# Patient Record
Sex: MALE | Race: WHITE | NOT HISPANIC OR LATINO | URBAN - METROPOLITAN AREA
[De-identification: names, ages, dates, MRNs, and addresses within clinical notes are randomized per-mention and may not be internally consistent; named-entity substitution may affect disease eponyms.]

---

## 2024-06-21 ENCOUNTER — OFFICE VISIT (OUTPATIENT)
Dept: UROLOGY | Facility: MEDICAL CENTER | Age: 34
End: 2024-06-21
Payer: COMMERCIAL

## 2024-06-21 VITALS
HEART RATE: 62 BPM | WEIGHT: 215 LBS | BODY MASS INDEX: 30.78 KG/M2 | DIASTOLIC BLOOD PRESSURE: 70 MMHG | OXYGEN SATURATION: 98 % | HEIGHT: 70 IN | SYSTOLIC BLOOD PRESSURE: 120 MMHG

## 2024-06-21 DIAGNOSIS — Z30.09 STERILIZATION CONSULT: Primary | ICD-10-CM

## 2024-06-21 PROCEDURE — 99203 OFFICE O/P NEW LOW 30 MIN: CPT | Performed by: UROLOGY

## 2024-06-21 RX ORDER — OMEGA-3S/DHA/EPA/FISH OIL/D3 300MG-1000
400 CAPSULE ORAL DAILY
COMMUNITY

## 2024-06-21 RX ORDER — DIPHENOXYLATE HYDROCHLORIDE AND ATROPINE SULFATE 2.5; .025 MG/1; MG/1
1 TABLET ORAL DAILY
COMMUNITY

## 2024-06-21 RX ORDER — ALPRAZOLAM 1 MG/1
TABLET ORAL
Qty: 1 TABLET | Refills: 0 | Status: SHIPPED | OUTPATIENT
Start: 2024-06-21

## 2024-06-21 RX ORDER — RIBOFLAVIN (VITAMIN B2) 100 MG
100 TABLET ORAL DAILY
COMMUNITY

## 2024-06-21 NOTE — PROGRESS NOTES
"   HISTORY:        This patient has 2 children and would like to have a vasectomy.  He and his wife or partner are sure they want no more children, and are aware that vasectomy is intended to be a permanent form of sterilization.    He has been told and understands the following:         We will order a semen analysis to check for sterility after two months, and that he must use protection during that time.         No guarantee can be made of permanent sterility, and that failure of the procedure is not common, but can occur.  Furthermore, spontaneous reestablishment of the flow sperm is quite rare but is a possible reason for failure of the procedure.           Although it is not mandatory, if he wants to request another semen sample at any time in the future, to ensure continued sterility, he can do so, at his decision.         Potential complications of the procedure include, but are not limited to:  Excessive bleeding which can cause significant swelling; infections; chronic lingering pain of the testicle; damage to the blood supply of the testicle, which might lead to testicular atrophy.         The patient has told me he understands the above statements, and wishes to proceed with scheduling the vasectomy.    The following portions of the patient's history were reviewed and updated as appropriate: allergies, current medications, past family history, past medical history, past social history, past surgical history, and problem list.    Review of Systems      Objective:     Physical Exam      No results found for: \"PSA\"]  BUN   Date Value Ref Range Status   08/07/2020 12 7 - 28 mg/dL Final     Creatinine   Date Value Ref Range Status   08/07/2020 1.08 0.53 - 1.30 mg/dL Final     No components found for: \"CBC\"      There is no problem list on file for this patient.       Diagnoses and all orders for this visit:    Sterilization consult  -     ALPRAZolam (XANAX) 1 mg tablet; 1-2 hr before procedure    Other " orders  -     multivitamin (THERAGRAN) TABS; Take 1 tablet by mouth daily  -     Ascorbic Acid (vitamin C) 100 MG tablet; Take 100 mg by mouth daily  -     cholecalciferol (VITAMIN D3) 400 units tablet; Take 400 Units by mouth daily  -     Omega-3 Fatty Acids (Fish Oil) 300 MG CAPS; Take by mouth           Patient ID: Terrell Oconnor is a 33 y.o. male.      Current Outpatient Medications:     ALPRAZolam (XANAX) 1 mg tablet, 1-2 hr before procedure, Disp: 1 tablet, Rfl: 0    Ascorbic Acid (vitamin C) 100 MG tablet, Take 100 mg by mouth daily, Disp: , Rfl:     cholecalciferol (VITAMIN D3) 400 units tablet, Take 400 Units by mouth daily, Disp: , Rfl:     multivitamin (THERAGRAN) TABS, Take 1 tablet by mouth daily, Disp: , Rfl:     Omega-3 Fatty Acids (Fish Oil) 300 MG CAPS, Take by mouth, Disp: , Rfl:     History reviewed. No pertinent past medical history.    Past Surgical History:   Procedure Laterality Date    WISDOM TOOTH EXTRACTION         Social History

## 2024-08-16 ENCOUNTER — PROCEDURE VISIT (OUTPATIENT)
Dept: UROLOGY | Facility: MEDICAL CENTER | Age: 34
End: 2024-08-16
Payer: COMMERCIAL

## 2024-08-16 VITALS
DIASTOLIC BLOOD PRESSURE: 66 MMHG | SYSTOLIC BLOOD PRESSURE: 100 MMHG | WEIGHT: 207 LBS | BODY MASS INDEX: 29.63 KG/M2 | HEIGHT: 70 IN | OXYGEN SATURATION: 99 % | HEART RATE: 67 BPM

## 2024-08-16 DIAGNOSIS — Z30.2 ENCOUNTER FOR VASECTOMY: Primary | ICD-10-CM

## 2024-08-16 PROCEDURE — 88302 TISSUE EXAM BY PATHOLOGIST: CPT | Performed by: PATHOLOGY

## 2024-08-16 PROCEDURE — 55250 REMOVAL OF SPERM DUCT(S): CPT | Performed by: UROLOGY

## 2024-08-20 PROCEDURE — 88302 TISSUE EXAM BY PATHOLOGIST: CPT | Performed by: PATHOLOGY

## 2024-10-24 ENCOUNTER — TELEPHONE (OUTPATIENT)
Age: 34
End: 2024-10-24

## 2024-10-24 ENCOUNTER — LAB (OUTPATIENT)
Dept: LAB | Facility: HOSPITAL | Age: 34
End: 2024-10-24
Attending: UROLOGY
Payer: COMMERCIAL

## 2024-10-24 DIAGNOSIS — Z30.2 ENCOUNTER FOR VASECTOMY: ICD-10-CM

## 2024-10-24 LAB
DEPRECATED CD4 CELLS/CD8 CELLS BLD: 3 ML
SPERM MOTILE SMN QL MICRO: NORMAL

## 2024-10-24 PROCEDURE — 89321 SEMEN ANAL SPERM DETECTION: CPT

## 2024-10-24 NOTE — TELEPHONE ENCOUNTER
Patient's wife called to find out where to drop off the semen analysis.  Informed her to call [facility] to schedule drop off.  She verbalized understanding.